# Patient Record
Sex: FEMALE | Race: WHITE | NOT HISPANIC OR LATINO | Employment: FULL TIME | URBAN - NONMETROPOLITAN AREA
[De-identification: names, ages, dates, MRNs, and addresses within clinical notes are randomized per-mention and may not be internally consistent; named-entity substitution may affect disease eponyms.]

---

## 2019-01-30 ENCOUNTER — OFFICE VISIT (OUTPATIENT)
Dept: INTERNAL MEDICINE | Facility: CLINIC | Age: 59
End: 2019-01-30

## 2019-01-30 VITALS
HEIGHT: 62 IN | TEMPERATURE: 97.4 F | OXYGEN SATURATION: 97 % | HEART RATE: 73 BPM | BODY MASS INDEX: 24.48 KG/M2 | SYSTOLIC BLOOD PRESSURE: 124 MMHG | WEIGHT: 133 LBS | DIASTOLIC BLOOD PRESSURE: 80 MMHG

## 2019-01-30 DIAGNOSIS — Z00.00 ANNUAL PHYSICAL EXAM: Primary | ICD-10-CM

## 2019-01-30 DIAGNOSIS — Z12.31 SCREENING MAMMOGRAM, ENCOUNTER FOR: ICD-10-CM

## 2019-01-30 LAB
25(OH)D3+25(OH)D2 SERPL-MCNC: 30.1 NG/ML
ALBUMIN SERPL-MCNC: 4.2 G/DL (ref 3.5–5)
ALBUMIN/GLOB SERPL: 1.7 G/DL (ref 1–2)
ALP SERPL-CCNC: 75 U/L (ref 38–126)
ALT SERPL-CCNC: 22 U/L (ref 13–69)
AST SERPL-CCNC: 17 U/L (ref 15–46)
BASOPHILS # BLD AUTO: 0.04 10*3/MM3 (ref 0–0.2)
BASOPHILS NFR BLD AUTO: 0.7 % (ref 0–2.5)
BILIRUB SERPL-MCNC: 0.8 MG/DL (ref 0.2–1.3)
BUN SERPL-MCNC: 15 MG/DL (ref 7–20)
BUN/CREAT SERPL: 16.7 (ref 7.1–23.5)
CALCIUM SERPL-MCNC: 9.5 MG/DL (ref 8.4–10.2)
CHLORIDE SERPL-SCNC: 109 MMOL/L (ref 98–107)
CHOLEST SERPL-MCNC: 198 MG/DL (ref 0–199)
CHOLEST/HDLC SERPL: 3.36 {RATIO}
CO2 SERPL-SCNC: 25 MMOL/L (ref 26–30)
CREAT SERPL-MCNC: 0.9 MG/DL (ref 0.6–1.3)
EOSINOPHIL # BLD AUTO: 0.06 10*3/MM3 (ref 0–0.7)
EOSINOPHIL NFR BLD AUTO: 1.1 % (ref 0–7)
ERYTHROCYTE [DISTWIDTH] IN BLOOD BY AUTOMATED COUNT: 12.5 % (ref 11.5–14.5)
GLOBULIN SER CALC-MCNC: 2.5 GM/DL
GLUCOSE SERPL-MCNC: 86 MG/DL (ref 74–98)
HBA1C MFR BLD: 5.6 %
HCT VFR BLD AUTO: 40.9 % (ref 37–47)
HDLC SERPL-MCNC: 59 MG/DL (ref 40–60)
HGB BLD-MCNC: 13.5 G/DL (ref 12–16)
IMM GRANULOCYTES # BLD AUTO: 0.01 10*3/MM3 (ref 0–0.06)
IMM GRANULOCYTES NFR BLD AUTO: 0.2 % (ref 0–0.6)
LDLC SERPL CALC-MCNC: 122 MG/DL (ref 0–99)
LYMPHOCYTES # BLD AUTO: 1.75 10*3/MM3 (ref 0.6–3.4)
LYMPHOCYTES NFR BLD AUTO: 31.3 % (ref 10–50)
MCH RBC QN AUTO: 30.8 PG (ref 27–31)
MCHC RBC AUTO-ENTMCNC: 33 G/DL (ref 30–37)
MCV RBC AUTO: 93.2 FL (ref 81–99)
MONOCYTES # BLD AUTO: 0.33 10*3/MM3 (ref 0–0.9)
MONOCYTES NFR BLD AUTO: 5.9 % (ref 0–12)
NEUTROPHILS # BLD AUTO: 3.41 10*3/MM3 (ref 2–6.9)
NEUTROPHILS NFR BLD AUTO: 60.8 % (ref 37–80)
NRBC BLD AUTO-RTO: 0 /100 WBC (ref 0–0)
PLATELET # BLD AUTO: 200 10*3/MM3 (ref 130–400)
POTASSIUM SERPL-SCNC: 4.1 MMOL/L (ref 3.5–5.1)
PROT SERPL-MCNC: 6.7 G/DL (ref 6.3–8.2)
RBC # BLD AUTO: 4.39 10*6/MM3 (ref 4.2–5.4)
SODIUM SERPL-SCNC: 141 MMOL/L (ref 137–145)
TRIGL SERPL-MCNC: 87 MG/DL
TSH SERPL DL<=0.005 MIU/L-ACNC: 5.15 MIU/ML (ref 0.47–4.68)
VLDLC SERPL CALC-MCNC: 17.4 MG/DL
WBC # BLD AUTO: 5.6 10*3/MM3 (ref 4.8–10.8)

## 2019-01-30 PROCEDURE — 99396 PREV VISIT EST AGE 40-64: CPT | Performed by: INTERNAL MEDICINE

## 2019-01-30 NOTE — PROGRESS NOTES
Chief Complaint   Patient presents with   • Annual Exam     Physical only.     Subjective   Sole Cr is a 58 y.o. female.     Patient is here for annual physical exam today.  No significant past medical history except DJD of low back.  Past surgical history of hysterectomy and .  Family history of breast cancer in her mother and colon cancer in paternal grandfather.  Her low back does not bother her at this time.  She does have numbness in her left leg.  She no longer runs, but she does walk for exercise about 3 miles a day.   She did have her colonoscopy done 3 yrs ago.  Next one is due in a few yrs.  She did have a polyp.  She has not had a mammogram in over 7-8 yrs.  Last one was done at [facility].   She does not smoke.  She drinks a glass of wine every few weeks.  No drugs.   She sees eye doctor once a yr.  Goes to dentist twice a yr.  She does wear her seatbelt. She does not text and drive.   She does have some anxiety.  She is using some CBD oil. She is sleeping thru the night and it helps her anxiety a lot.   She has had flu shot and Hep A vaccine.  She will get new shingles vaccine.          The following portions of the patient's history were reviewed and updated as appropriate: allergies, current medications, past family history, past medical history, past social history, past surgical history and problem list.    Review of Systems   Constitutional: Negative for activity change, appetite change and unexpected weight change.   HENT: Negative for hearing loss, trouble swallowing and voice change.    Eyes: Negative for visual disturbance.   Respiratory: Negative for shortness of breath.    Cardiovascular: Negative for chest pain, palpitations and leg swelling.   Gastrointestinal: Negative for abdominal pain and blood in stool.   Endocrine: Negative for cold intolerance and heat intolerance.   Genitourinary: Negative for difficulty urinating.   Musculoskeletal: Negative for back  "pain.   Skin: Negative.    Allergic/Immunologic: Negative for environmental allergies.   Neurological: Positive for numbness. Negative for headaches.   Psychiatric/Behavioral: Negative for dysphoric mood and sleep disturbance. The patient is nervous/anxious.    All other systems reviewed and are negative.      Objective   /80   Pulse 73   Temp 97.4 °F (36.3 °C)   Ht 157.5 cm (62\")   Wt 60.3 kg (133 lb)   SpO2 97%   BMI 24.33 kg/m²   Body mass index is 24.33 kg/m².  Physical Exam   Constitutional: She is oriented to person, place, and time. She appears well-developed and well-nourished. No distress.   Very pleasant female in NAD   HENT:   Head: Normocephalic and atraumatic.   Right Ear: External ear normal.   Left Ear: External ear normal.   Mouth/Throat: Oropharynx is clear and moist.   Eyes: Conjunctivae and EOM are normal. Pupils are equal, round, and reactive to light.   Neck: Normal range of motion. Neck supple. No thyromegaly present.   Cardiovascular: Normal rate, regular rhythm, normal heart sounds and intact distal pulses.   No murmur heard.  No carotid bruits   Pulmonary/Chest: Effort normal and breath sounds normal. No respiratory distress. She has no wheezes. Right breast exhibits no inverted nipple, no mass, no nipple discharge, no skin change and no tenderness. Left breast exhibits no inverted nipple, no mass, no nipple discharge, no skin change and no tenderness.   Abdominal: Soft. Bowel sounds are normal. She exhibits no distension. There is no tenderness.   Musculoskeletal: Normal range of motion. She exhibits no edema.   Lymphadenopathy:     She has no cervical adenopathy.   Neurological: She is alert and oriented to person, place, and time. No cranial nerve deficit. Coordination normal.   Absent left patellar reflex   Psychiatric: She has a normal mood and affect. Her behavior is normal. Judgment and thought content normal.   No significant anxiety on exam today   Nursing note and " vitals reviewed.      Assessment/Plan   Sole Cr is here today and the following problems have been addressed:      Sole was seen today for annual exam.    Diagnoses and all orders for this visit:    Annual physical exam  -     CBC & Differential  -     Comprehensive Metabolic Panel  -     Lipid Panel With / Chol / HDL Ratio  -     TSH  -     Vitamin D 25 Hydroxy  -     Hemoglobin A1c    Screening mammogram, encounter for  -     Mammo Screening Digital Tomosynthesis Bilateral With CAD; Future    Labs as noted  Recommend avoidance of processed foods  Exercise for 30 minutes 5 days a week as tolerated  Recommend annual eye doctor appointment, or as necessary  See your dentist twice a year.  Recommend that you brush teeth twice daily and floss minimum of once daily  Recommend regular seatbelt use  Do not text or use phone while driving  Mammogram ordered, breast exam done today, encourage monthly SBE  Her next colonoscopy is due in 2 yrs  She will get shingles vaccine at her place of employment    RTC one yr for annual exam        Please note that portions of this note were completed with a voice recognition program.  Efforts were made to edit dictation, but occasionally words are mistranscribed.

## 2020-01-31 ENCOUNTER — HOSPITAL ENCOUNTER (OUTPATIENT)
Dept: GENERAL RADIOLOGY | Facility: HOSPITAL | Age: 60
Discharge: HOME | End: 2020-01-31
Admitting: INTERNAL MEDICINE

## 2020-01-31 ENCOUNTER — OFFICE VISIT (OUTPATIENT)
Dept: INTERNAL MEDICINE | Facility: CLINIC | Age: 60
End: 2020-01-31

## 2020-01-31 VITALS
HEART RATE: 74 BPM | WEIGHT: 133 LBS | BODY MASS INDEX: 24.48 KG/M2 | DIASTOLIC BLOOD PRESSURE: 62 MMHG | OXYGEN SATURATION: 98 % | HEIGHT: 62 IN | SYSTOLIC BLOOD PRESSURE: 118 MMHG | TEMPERATURE: 98.5 F

## 2020-01-31 DIAGNOSIS — R79.89 ELEVATED TSH: ICD-10-CM

## 2020-01-31 DIAGNOSIS — R07.89 RIGHT-SIDED CHEST WALL PAIN: ICD-10-CM

## 2020-01-31 DIAGNOSIS — E78.41 ELEVATED LIPOPROTEIN(A): ICD-10-CM

## 2020-01-31 DIAGNOSIS — Z00.00 ANNUAL PHYSICAL EXAM: Primary | ICD-10-CM

## 2020-01-31 LAB
ALBUMIN SERPL-MCNC: 4.5 G/DL (ref 3.5–5.2)
ALBUMIN/GLOB SERPL: 2.5 G/DL
ALP SERPL-CCNC: 79 U/L (ref 39–117)
ALT SERPL-CCNC: 14 U/L (ref 1–33)
AST SERPL-CCNC: 19 U/L (ref 1–32)
BASOPHILS # BLD AUTO: 0.05 10*3/MM3 (ref 0–0.2)
BASOPHILS NFR BLD AUTO: 0.8 % (ref 0–1.5)
BILIRUB SERPL-MCNC: 0.7 MG/DL (ref 0.2–1.2)
BUN SERPL-MCNC: 15 MG/DL (ref 6–20)
BUN/CREAT SERPL: 14.9 (ref 7–25)
CALCIUM SERPL-MCNC: 9.3 MG/DL (ref 8.6–10.5)
CHLORIDE SERPL-SCNC: 108 MMOL/L (ref 98–107)
CHOLEST SERPL-MCNC: 245 MG/DL (ref 0–200)
CHOLEST/HDLC SERPL: 4.08 {RATIO}
CO2 SERPL-SCNC: 25.1 MMOL/L (ref 22–29)
CREAT SERPL-MCNC: 1.01 MG/DL (ref 0.57–1)
EOSINOPHIL # BLD AUTO: 0.1 10*3/MM3 (ref 0–0.4)
EOSINOPHIL NFR BLD AUTO: 1.5 % (ref 0.3–6.2)
ERYTHROCYTE [DISTWIDTH] IN BLOOD BY AUTOMATED COUNT: 12.8 % (ref 12.3–15.4)
GLOBULIN SER CALC-MCNC: 1.8 GM/DL
GLUCOSE SERPL-MCNC: 90 MG/DL (ref 65–99)
HCT VFR BLD AUTO: 41.1 % (ref 34–46.6)
HDLC SERPL-MCNC: 60 MG/DL (ref 40–60)
HGB BLD-MCNC: 14.1 G/DL (ref 12–15.9)
IMM GRANULOCYTES # BLD AUTO: 0.02 10*3/MM3 (ref 0–0.05)
IMM GRANULOCYTES NFR BLD AUTO: 0.3 % (ref 0–0.5)
LDLC SERPL CALC-MCNC: 162 MG/DL (ref 0–100)
LYMPHOCYTES # BLD AUTO: 1.51 10*3/MM3 (ref 0.7–3.1)
LYMPHOCYTES NFR BLD AUTO: 23.2 % (ref 19.6–45.3)
MCH RBC QN AUTO: 30.7 PG (ref 26.6–33)
MCHC RBC AUTO-ENTMCNC: 34.3 G/DL (ref 31.5–35.7)
MCV RBC AUTO: 89.5 FL (ref 79–97)
MONOCYTES # BLD AUTO: 0.4 10*3/MM3 (ref 0.1–0.9)
MONOCYTES NFR BLD AUTO: 6.1 % (ref 5–12)
NEUTROPHILS # BLD AUTO: 4.44 10*3/MM3 (ref 1.7–7)
NEUTROPHILS NFR BLD AUTO: 68.1 % (ref 42.7–76)
NRBC BLD AUTO-RTO: 0 /100 WBC (ref 0–0.2)
PLATELET # BLD AUTO: 176 10*3/MM3 (ref 140–450)
POTASSIUM SERPL-SCNC: 4.2 MMOL/L (ref 3.5–5.2)
PROT SERPL-MCNC: 6.3 G/DL (ref 6–8.5)
RBC # BLD AUTO: 4.59 10*6/MM3 (ref 3.77–5.28)
SODIUM SERPL-SCNC: 146 MMOL/L (ref 136–145)
TRIGL SERPL-MCNC: 114 MG/DL (ref 0–150)
TSH SERPL DL<=0.005 MIU/L-ACNC: 6.44 UIU/ML (ref 0.27–4.2)
VLDLC SERPL CALC-MCNC: 22.8 MG/DL
WBC # BLD AUTO: 6.52 10*3/MM3 (ref 3.4–10.8)

## 2020-01-31 PROCEDURE — 90674 CCIIV4 VAC NO PRSV 0.5 ML IM: CPT | Performed by: INTERNAL MEDICINE

## 2020-01-31 PROCEDURE — 71046 X-RAY EXAM CHEST 2 VIEWS: CPT

## 2020-01-31 PROCEDURE — 99396 PREV VISIT EST AGE 40-64: CPT | Performed by: INTERNAL MEDICINE

## 2020-01-31 PROCEDURE — 90471 IMMUNIZATION ADMIN: CPT | Performed by: INTERNAL MEDICINE

## 2020-01-31 NOTE — PROGRESS NOTES
Chief Complaint   Patient presents with   • Annual Exam     Physical only.      Subjective   Sole Cr is a 59 y.o. female.     Pt is here today for annual PE.   She has no significant PMH.  She has had a hysterectomy.   HCM- mammogram UTD, due in March.  Colonoscopy done 2015 per Dr Patricio and due this yr-she states he will contact her or she will contact him.  She has had Hep A vaccines and zostavax.  No seasonal flu vaccine or shingrix.  She will get her flu shot today and the shingrix at pharmacy.     Labs last year revealed only mildly elevated LDL of 122 and TSH of 5.15.   She denies any hair loss, depression, constipation, voice changes or difficulty swallowing.   Her father has high cholesterol and is on medication.   She tries to eat a healthy diet and does bring her lunch to work.  She does eat out several times a week.    She walks almost daily, has a dog she walks with.    She sees the eye doctor annually and the dentist twice a yr.   Does wear her seatbelt and does not text and drive.  She has some right sided chest pain that radiates to her back, it is worse with chest wall movement.  No pain with breathing.  No pain with movement of shoulder.  It only hurts when she is laying in bed.  She has no neck pain.  Has been bothering her for several months. She only has pain when she lays on her back, not when she lays on her stomach. It does not hurt every night.  Her weight has been stable.   She has never been a smoker.        The following portions of the patient's history were reviewed and updated as appropriate: allergies, current medications, past family history, past medical history, past social history, past surgical history and problem list.    Review of Systems   Constitutional: Negative for activity change, appetite change and unexpected weight change.   HENT: Negative for hearing loss, tinnitus, trouble swallowing and voice change.    Eyes: Negative for visual disturbance.   Respiratory:  "Negative for shortness of breath.    Cardiovascular: Negative for chest pain, palpitations and leg swelling.   Gastrointestinal: Negative for abdominal pain and constipation.   Endocrine: Negative for cold intolerance and heat intolerance.   Genitourinary: Negative for difficulty urinating.   Musculoskeletal: Negative for back pain.        Right sided chest wall pain, see HPI   Neurological: Negative for headaches.   Psychiatric/Behavioral: Negative for dysphoric mood and sleep disturbance.   All other systems reviewed and are negative.      Objective   /62   Pulse 74   Temp 98.5 °F (36.9 °C)   Ht 157.5 cm (62\")   Wt 60.3 kg (133 lb)   SpO2 98%   BMI 24.33 kg/m²   Body mass index is 24.33 kg/m².  Physical Exam   Constitutional: She is oriented to person, place, and time. She appears well-developed and well-nourished.   Very pleasant female who appears her stated age, she is in no distress today   HENT:   Head: Normocephalic and atraumatic.   Right Ear: External ear normal.   Left Ear: External ear normal.   Mouth/Throat: Oropharynx is clear and moist.   TMs normal bilaterally   Eyes: Pupils are equal, round, and reactive to light. EOM are normal.   Neck: Normal range of motion. Neck supple. No thyromegaly present.   Cardiovascular: Normal rate, regular rhythm, normal heart sounds and intact distal pulses.   No murmur heard.  No carotid bruits   Pulmonary/Chest: Effort normal and breath sounds normal. Right breast exhibits no inverted nipple, no mass, no nipple discharge, no skin change and no tenderness. Left breast exhibits no inverted nipple, no mass, no nipple discharge, no skin change and no tenderness.   Abdominal: Soft. Bowel sounds are normal. She exhibits no distension and no mass. There is no tenderness.   Musculoskeletal: Normal range of motion. She exhibits no edema or tenderness.   No pain over costochondral joints, no pain to palpation over right scapula or upper shoulder muscles or back " muscles   Lymphadenopathy:     She has no cervical adenopathy.   Neurological: She is alert and oriented to person, place, and time. She displays normal reflexes. No cranial nerve deficit. Coordination normal.   Skin: No rash noted.   Psychiatric: She has a normal mood and affect. Her behavior is normal. Judgment and thought content normal.   Nursing note and vitals reviewed.      Assessment/Plan   Sole Cr is here today and the following problems have been addressed:      Sole was seen today for annual exam.    Diagnoses and all orders for this visit:    Annual physical exam  -     CBC & Differential  -     Comprehensive Metabolic Panel  -     Lipid Panel With / Chol / HDL Ratio  -     TSH    Elevated TSH  -     TSH    Elevated lipoprotein(a)  -     Comprehensive Metabolic Panel  -     Lipid Panel With / Chol / HDL Ratio    Right-sided chest wall pain  -     XR Chest 2 View; Future  -     CBC & Differential    Labs as noted  Recommend avoidance of processed foods  Exercise for 30 minutes 5 days a week as tolerated  Recommend annual eye doctor appointment, or as necessary  See your dentist twice a year.  Recommend that you brush teeth twice daily and floss minimum of once daily  Recommend regular seatbelt use  Do not text or use phone while driving  Flu shot given today  X-ray of chest to evaluate ribs as well as underlying lung due to atypical right-sided chest pain-we will contact patient with results and further plan of care  Encourage patient to get shingles vaccine at local pharmacy  Her colonoscopy is due this year, if she does not hear from Dr. Patricio she will contact his office for scheduling  Patient will schedule her mammogram in March when due    Return to clinic in 1 year or as needed        Please note that portions of this note were completed with a voice recognition program.  Efforts were made to edit dictation, but occasionally words are mistranscribed.

## 2020-02-03 NOTE — PROGRESS NOTES
Please tell patient her chest x-ray is normal with no evidence of mass and no abnormalities of ribs noted.

## 2021-02-01 ENCOUNTER — OFFICE VISIT (OUTPATIENT)
Dept: INTERNAL MEDICINE | Facility: CLINIC | Age: 61
End: 2021-02-01

## 2021-02-01 ENCOUNTER — TELEPHONE (OUTPATIENT)
Dept: INTERNAL MEDICINE | Facility: CLINIC | Age: 61
End: 2021-02-01

## 2021-02-01 VITALS
BODY MASS INDEX: 25.95 KG/M2 | DIASTOLIC BLOOD PRESSURE: 60 MMHG | HEIGHT: 62 IN | WEIGHT: 141 LBS | HEART RATE: 81 BPM | SYSTOLIC BLOOD PRESSURE: 122 MMHG | TEMPERATURE: 97.1 F | OXYGEN SATURATION: 98 %

## 2021-02-01 DIAGNOSIS — Z13.820 SCREENING FOR OSTEOPOROSIS: ICD-10-CM

## 2021-02-01 DIAGNOSIS — E78.2 MIXED HYPERLIPIDEMIA: ICD-10-CM

## 2021-02-01 DIAGNOSIS — Z11.59 ENCOUNTER FOR HEPATITIS C SCREENING TEST FOR LOW RISK PATIENT: ICD-10-CM

## 2021-02-01 DIAGNOSIS — Z00.00 ANNUAL PHYSICAL EXAM: Primary | ICD-10-CM

## 2021-02-01 DIAGNOSIS — R79.89 ELEVATED TSH: ICD-10-CM

## 2021-02-01 PROBLEM — R07.89 RIGHT-SIDED CHEST WALL PAIN: Status: RESOLVED | Noted: 2020-01-31 | Resolved: 2021-02-01

## 2021-02-01 PROBLEM — E78.41 ELEVATED LIPOPROTEIN(A): Status: RESOLVED | Noted: 2020-01-31 | Resolved: 2021-02-01

## 2021-02-01 LAB
ALBUMIN SERPL-MCNC: 4.1 G/DL (ref 3.5–5.2)
ALBUMIN/GLOB SERPL: 2.1 G/DL
ALP SERPL-CCNC: 87 U/L (ref 39–117)
ALT SERPL-CCNC: 16 U/L (ref 1–33)
AST SERPL-CCNC: 16 U/L (ref 1–32)
BASOPHILS # BLD AUTO: 0.03 10*3/MM3 (ref 0–0.2)
BASOPHILS NFR BLD AUTO: 0.5 % (ref 0–1.5)
BILIRUB SERPL-MCNC: 0.3 MG/DL (ref 0–1.2)
BUN SERPL-MCNC: 23 MG/DL (ref 8–23)
BUN/CREAT SERPL: 24.5 (ref 7–25)
CALCIUM SERPL-MCNC: 8.9 MG/DL (ref 8.6–10.5)
CHLORIDE SERPL-SCNC: 110 MMOL/L (ref 98–107)
CHOLEST SERPL-MCNC: 218 MG/DL (ref 0–200)
CHOLEST/HDLC SERPL: 3.46 {RATIO}
CO2 SERPL-SCNC: 23.2 MMOL/L (ref 22–29)
CREAT SERPL-MCNC: 0.94 MG/DL (ref 0.57–1)
EOSINOPHIL # BLD AUTO: 0.08 10*3/MM3 (ref 0–0.4)
EOSINOPHIL NFR BLD AUTO: 1.4 % (ref 0.3–6.2)
ERYTHROCYTE [DISTWIDTH] IN BLOOD BY AUTOMATED COUNT: 13.1 % (ref 12.3–15.4)
GLOBULIN SER CALC-MCNC: 2 GM/DL
GLUCOSE SERPL-MCNC: 99 MG/DL (ref 65–99)
HCT VFR BLD AUTO: 40.5 % (ref 34–46.6)
HDLC SERPL-MCNC: 63 MG/DL (ref 40–60)
HGB BLD-MCNC: 13.8 G/DL (ref 12–15.9)
IMM GRANULOCYTES # BLD AUTO: 0 10*3/MM3 (ref 0–0.05)
IMM GRANULOCYTES NFR BLD AUTO: 0 % (ref 0–0.5)
LDLC SERPL CALC-MCNC: 137 MG/DL (ref 0–100)
LYMPHOCYTES # BLD AUTO: 1.66 10*3/MM3 (ref 0.7–3.1)
LYMPHOCYTES NFR BLD AUTO: 29.1 % (ref 19.6–45.3)
MCH RBC QN AUTO: 31.7 PG (ref 26.6–33)
MCHC RBC AUTO-ENTMCNC: 34.1 G/DL (ref 31.5–35.7)
MCV RBC AUTO: 93.1 FL (ref 79–97)
MONOCYTES # BLD AUTO: 0.41 10*3/MM3 (ref 0.1–0.9)
MONOCYTES NFR BLD AUTO: 7.2 % (ref 5–12)
NEUTROPHILS # BLD AUTO: 3.53 10*3/MM3 (ref 1.7–7)
NEUTROPHILS NFR BLD AUTO: 61.8 % (ref 42.7–76)
NRBC BLD AUTO-RTO: 0 /100 WBC (ref 0–0.2)
PLATELET # BLD AUTO: 188 10*3/MM3 (ref 140–450)
POTASSIUM SERPL-SCNC: 4.3 MMOL/L (ref 3.5–5.2)
PROT SERPL-MCNC: 6.1 G/DL (ref 6–8.5)
RBC # BLD AUTO: 4.35 10*6/MM3 (ref 3.77–5.28)
SODIUM SERPL-SCNC: 143 MMOL/L (ref 136–145)
T4 FREE SERPL-MCNC: 0.98 NG/DL (ref 0.93–1.7)
TRIGL SERPL-MCNC: 104 MG/DL (ref 0–150)
TSH SERPL DL<=0.005 MIU/L-ACNC: 5.98 UIU/ML (ref 0.27–4.2)
VLDLC SERPL CALC-MCNC: 18 MG/DL (ref 5–40)
WBC # BLD AUTO: 5.71 10*3/MM3 (ref 3.4–10.8)

## 2021-02-01 PROCEDURE — 99396 PREV VISIT EST AGE 40-64: CPT | Performed by: INTERNAL MEDICINE

## 2021-02-01 NOTE — TELEPHONE ENCOUNTER
Attempted contacting Pt to let her know that she left her gloves.     HUB may deliver message: Patient left gloves at Dr. Vermeesch's office.

## 2021-02-01 NOTE — PROGRESS NOTES
Chief Complaint   Patient presents with   • Annual Exam     Subjective   Sole Cr is a 60 y.o. female.     Pt is here today for annual PE.   She has no significant PMH.  She has had a hysterectomy.   HCM- mammogram UTD, due in March.  Colonoscopy done 2020 per Dr Patricio-next due in 10 yrs again.  She has had Hep A vaccines and zostavax.  Seasonal flu vaccine is not up-to-date.  She still has not had a Shingrix vaccine.   She had her first COVID vaccine done thru work.  She has never had a DEXA scan.  Her mother had OP.  She does not take any vit D   Labs last year revealed elevated TSH of 6.6 and elevated total and LDL cholesterol however lipid ratio was just above 4.  She denies any hair loss, depression, constipation, voice changes or difficulty swallowing.   Her father has high cholesterol and is on medication.   She has been working from home and is not eating a HH diet.  She walks almost daily, has a dog she walks with.  She does not exercise though.   She sees the eye doctor annually and the dentist twice a yr.  No recent issues with vision.   Does wear her seatbelt and does not text and drive.  Does not drink or smoke.   Saw derm and is using a cream over nose, very red and tender.         The following portions of the patient's history were reviewed and updated as appropriate: allergies, current medications, past family history, past medical history, past social history, past surgical history and problem list.    Review of Systems   Constitutional: Positive for activity change. Negative for appetite change.   HENT: Negative.    Eyes: Negative.    Respiratory: Negative.    Cardiovascular: Negative.    Gastrointestinal:        Increased GERD sxs   Endocrine: Negative.    Genitourinary: Negative.    Musculoskeletal: Negative.    Skin:        Skin changes over nose   Allergic/Immunologic: Negative.    Neurological: Negative.    Hematological: Negative.    Psychiatric/Behavioral: Negative.        Objective  "  /60   Pulse 81   Temp 97.1 °F (36.2 °C)   Ht 157.5 cm (62\")   Wt 64 kg (141 lb)   SpO2 98%   BMI 25.79 kg/m²   Body mass index is 25.79 kg/m².  Physical Exam  Vitals signs and nursing note reviewed.   Constitutional:       General: She is not in acute distress.     Appearance: Normal appearance. She is well-developed. She is not ill-appearing.      Comments: Kind and pleasant female, appears her age and in no distress today   HENT:      Head: Normocephalic and atraumatic.      Right Ear: Tympanic membrane, ear canal and external ear normal.      Left Ear: Tympanic membrane, ear canal and external ear normal.   Eyes:      General:         Right eye: No discharge.         Left eye: No discharge.      Extraocular Movements: Extraocular movements intact.      Conjunctiva/sclera: Conjunctivae normal.      Pupils: Pupils are equal, round, and reactive to light.   Neck:      Musculoskeletal: Normal range of motion and neck supple.      Thyroid: No thyromegaly.      Comments: No thyromegaly or masses  Cardiovascular:      Rate and Rhythm: Normal rate and regular rhythm.      Pulses: Normal pulses.      Heart sounds: Normal heart sounds. No murmur.      Comments: No carotid bruits  Pulmonary:      Effort: Pulmonary effort is normal. No respiratory distress.      Breath sounds: Normal breath sounds. No wheezing.   Abdominal:      General: Bowel sounds are normal. There is no distension.      Palpations: Abdomen is soft.      Tenderness: There is no abdominal tenderness.   Musculoskeletal: Normal range of motion.      Right lower leg: No edema.      Left lower leg: No edema.   Lymphadenopathy:      Cervical: No cervical adenopathy.   Skin:     General: Skin is warm.      Findings: Erythema present. No rash.      Comments: Nose is erythematous and dry with mild peeling due to chemical she is using from dermatologist   Neurological:      General: No focal deficit present.      Mental Status: She is alert and " oriented to person, place, and time. Mental status is at baseline.      Cranial Nerves: No cranial nerve deficit.      Motor: No weakness.      Coordination: Coordination normal.   Psychiatric:         Mood and Affect: Mood normal.         Behavior: Behavior normal.         Thought Content: Thought content normal.         Judgment: Judgment normal.         Assessment/Plan   Sole Cr is here today and the following problems have been addressed:      Diagnoses and all orders for this visit:    1. Annual physical exam (Primary)  -     CBC & Differential  -     Comprehensive Metabolic Panel  -     Lipid Panel With / Chol / HDL Ratio  -     TSH  -     T4, Free  -     Hepatitis C Antibody    2. Elevated TSH  -     TSH  -     T4, Free    3. Mixed hyperlipidemia  -     Comprehensive Metabolic Panel    4. Encounter for hepatitis C screening test for low risk patient  -     Hepatitis C Antibody    5. Screening for osteoporosis  -     DEXA Bone Density Axial; Future    Labs as noted  Recommend annual eye doctor appointment, or as necessary  See your dentist twice a year.  Recommend that you brush teeth twice daily and floss minimum of once daily  Recommend regular seatbelt use  Do not text or use phone while driving  Patient will schedule her mammogram, she declined breast exam today  DEXA scan ordered  Recommend vitamin D 1000 units daily  Follow heart healthy/low cholesterol diet  Avoid processed/fried foods/fast food  Exercise as tolerated up to 30 minutes 5 days a week  Take all medications as prescribed  She has had part 1 of Covid vaccine and will receive part 2 with her employer    Return to clinic in 1 year for annual exam      Please note that portions of this note were completed with a voice recognition program.  Efforts were made to edit dictation, but occasionally words are mistranscribed.

## 2021-02-02 LAB — HCV AB S/CO SERPL IA: <0.1 S/CO RATIO (ref 0–0.9)

## 2021-02-11 ENCOUNTER — APPOINTMENT (OUTPATIENT)
Dept: BONE DENSITY | Facility: HOSPITAL | Age: 61
End: 2021-02-11

## 2021-02-23 ENCOUNTER — APPOINTMENT (OUTPATIENT)
Dept: BONE DENSITY | Facility: HOSPITAL | Age: 61
End: 2021-02-23

## 2021-02-23 DIAGNOSIS — Z13.820 SCREENING FOR OSTEOPOROSIS: ICD-10-CM

## 2021-02-23 PROCEDURE — 77080 DXA BONE DENSITY AXIAL: CPT

## 2021-02-23 NOTE — PROGRESS NOTES
Please tell patient bone density scan reveals normal bone density in lumbar spine, however osteopenia in left femoral neck.  I recommend she start vitamin D 2000 units daily and eat plenty of foods high in calcium such as dark leafy greens, yogurt and organic milk.

## 2021-10-02 PROCEDURE — U0004 COV-19 TEST NON-CDC HGH THRU: HCPCS | Performed by: NURSE PRACTITIONER

## 2022-02-02 ENCOUNTER — OFFICE VISIT (OUTPATIENT)
Dept: INTERNAL MEDICINE | Facility: CLINIC | Age: 62
End: 2022-02-02

## 2022-02-02 VITALS
TEMPERATURE: 97.3 F | SYSTOLIC BLOOD PRESSURE: 120 MMHG | HEIGHT: 62 IN | DIASTOLIC BLOOD PRESSURE: 68 MMHG | OXYGEN SATURATION: 98 % | BODY MASS INDEX: 24.11 KG/M2 | HEART RATE: 78 BPM | WEIGHT: 131 LBS

## 2022-02-02 DIAGNOSIS — Z12.31 SCREENING MAMMOGRAM, ENCOUNTER FOR: ICD-10-CM

## 2022-02-02 DIAGNOSIS — M85.852 OSTEOPENIA OF NECK OF LEFT FEMUR: ICD-10-CM

## 2022-02-02 DIAGNOSIS — R79.89 ELEVATED TSH: ICD-10-CM

## 2022-02-02 DIAGNOSIS — Z00.00 ANNUAL PHYSICAL EXAM: Primary | ICD-10-CM

## 2022-02-02 DIAGNOSIS — F41.9 ANXIETY: ICD-10-CM

## 2022-02-02 DIAGNOSIS — E78.2 MIXED HYPERLIPIDEMIA: ICD-10-CM

## 2022-02-02 PROBLEM — Z13.820 SCREENING FOR OSTEOPOROSIS: Status: RESOLVED | Noted: 2021-02-01 | Resolved: 2022-02-02

## 2022-02-02 PROBLEM — Z11.59 ENCOUNTER FOR HEPATITIS C SCREENING TEST FOR LOW RISK PATIENT: Status: RESOLVED | Noted: 2021-02-01 | Resolved: 2022-02-02

## 2022-02-02 LAB
ALBUMIN SERPL-MCNC: 4.5 G/DL (ref 3.5–5.2)
ALBUMIN/GLOB SERPL: 2.4 G/DL
ALP SERPL-CCNC: 82 U/L (ref 39–117)
ALT SERPL-CCNC: 17 U/L (ref 1–33)
AST SERPL-CCNC: 16 U/L (ref 1–32)
BASOPHILS # BLD AUTO: 0.05 10*3/MM3 (ref 0–0.2)
BASOPHILS NFR BLD AUTO: 1 % (ref 0–1.5)
BILIRUB SERPL-MCNC: 0.4 MG/DL (ref 0–1.2)
BUN SERPL-MCNC: 18 MG/DL (ref 8–23)
BUN/CREAT SERPL: 17.8 (ref 7–25)
CALCIUM SERPL-MCNC: 9.6 MG/DL (ref 8.6–10.5)
CHLORIDE SERPL-SCNC: 106 MMOL/L (ref 98–107)
CHOLEST SERPL-MCNC: 227 MG/DL (ref 0–200)
CHOLEST/HDLC SERPL: 3.29 {RATIO}
CO2 SERPL-SCNC: 27.6 MMOL/L (ref 22–29)
CREAT SERPL-MCNC: 1.01 MG/DL (ref 0.57–1)
EOSINOPHIL # BLD AUTO: 0.15 10*3/MM3 (ref 0–0.4)
EOSINOPHIL NFR BLD AUTO: 3.1 % (ref 0.3–6.2)
ERYTHROCYTE [DISTWIDTH] IN BLOOD BY AUTOMATED COUNT: 13.2 % (ref 12.3–15.4)
GLOBULIN SER CALC-MCNC: 1.9 GM/DL
GLUCOSE SERPL-MCNC: 93 MG/DL (ref 65–99)
HCT VFR BLD AUTO: 44.8 % (ref 34–46.6)
HDLC SERPL-MCNC: 69 MG/DL (ref 40–60)
HGB BLD-MCNC: 14.3 G/DL (ref 12–15.9)
IMM GRANULOCYTES # BLD AUTO: 0.01 10*3/MM3 (ref 0–0.05)
IMM GRANULOCYTES NFR BLD AUTO: 0.2 % (ref 0–0.5)
LDLC SERPL CALC-MCNC: 144 MG/DL (ref 0–100)
LYMPHOCYTES # BLD AUTO: 1.78 10*3/MM3 (ref 0.7–3.1)
LYMPHOCYTES NFR BLD AUTO: 37.1 % (ref 19.6–45.3)
MCH RBC QN AUTO: 30.3 PG (ref 26.6–33)
MCHC RBC AUTO-ENTMCNC: 31.9 G/DL (ref 31.5–35.7)
MCV RBC AUTO: 94.9 FL (ref 79–97)
MONOCYTES # BLD AUTO: 0.39 10*3/MM3 (ref 0.1–0.9)
MONOCYTES NFR BLD AUTO: 8.1 % (ref 5–12)
NEUTROPHILS # BLD AUTO: 2.42 10*3/MM3 (ref 1.7–7)
NEUTROPHILS NFR BLD AUTO: 50.5 % (ref 42.7–76)
NRBC BLD AUTO-RTO: 0 /100 WBC (ref 0–0.2)
PLATELET # BLD AUTO: 200 10*3/MM3 (ref 140–450)
POTASSIUM SERPL-SCNC: 4.6 MMOL/L (ref 3.5–5.2)
PROT SERPL-MCNC: 6.4 G/DL (ref 6–8.5)
RBC # BLD AUTO: 4.72 10*6/MM3 (ref 3.77–5.28)
SODIUM SERPL-SCNC: 143 MMOL/L (ref 136–145)
T4 FREE SERPL-MCNC: 1.2 NG/DL (ref 0.93–1.7)
TRIGL SERPL-MCNC: 78 MG/DL (ref 0–150)
TSH SERPL DL<=0.005 MIU/L-ACNC: 5.53 UIU/ML (ref 0.27–4.2)
VLDLC SERPL CALC-MCNC: 14 MG/DL (ref 5–40)
WBC # BLD AUTO: 4.8 10*3/MM3 (ref 3.4–10.8)

## 2022-02-02 PROCEDURE — 99396 PREV VISIT EST AGE 40-64: CPT | Performed by: INTERNAL MEDICINE

## 2022-02-02 RX ORDER — BUSPIRONE HYDROCHLORIDE 10 MG/1
10 TABLET ORAL 3 TIMES DAILY PRN
Qty: 60 TABLET | Refills: 3 | Status: SHIPPED | OUTPATIENT
Start: 2022-02-02

## 2022-02-02 NOTE — PROGRESS NOTES
Chief Complaint   Patient presents with   • Annual Exam     Physical only.      Subjective   Sole Cr is a 61 y.o. female.     Pt is here today for annual PE.   She has no significant PMH.  She has had a hysterectomy.   HCM- mammogram March 2020, past due.  Colonoscopy done 2020 per Dr Patricio-next due in 10 yrs again.  She has had Hep A vaccines and zostavax.  Seasonal flu vaccine is not up-to-date.  She still has not had a Shingrix vaccine.   She had 2 COVID vaccines but no booster.  DEXA 2021 revealed osteopenia-she is taking vit D 2000 u a day.   Labs last year revealed elevated TSH of 5.9 and elevated total and LDL cholesterol.  She denies any hair loss, depression, constipation, voice changes or difficulty swallowing.    She has been having some back pain on and off.   She walks almost daily with her dog.  She does not exercise though.   She tries to eat a HH diet, avoids fast and fatty foods.   She sees the eye doctor annually and the dentist twice a yr.  No recent issues with vision.   Does wear her seatbelt and does not text and drive.  Does not drink or smoke.   She has been having some anxiety over the past yr.  Her son is currently in outpatient drug rehab program.  She is always anxious about him.  She is sleeping OK.        The following portions of the patient's history were reviewed and updated as appropriate: allergies, current medications, past family history, past medical history, past social history, past surgical history and problem list.    Review of Systems   Constitutional: Negative.    HENT: Negative.  Negative for trouble swallowing and voice change.    Eyes: Negative.    Respiratory: Negative.    Cardiovascular: Negative.    Gastrointestinal: Negative.  Negative for constipation.   Endocrine: Negative.  Negative for cold intolerance and heat intolerance.   Genitourinary: Negative.    Musculoskeletal: Positive for back pain.   Skin: Negative.    Allergic/Immunologic: Negative.   "  Neurological: Negative.    Hematological: Negative.    Psychiatric/Behavioral: Negative for dysphoric mood and sleep disturbance. The patient is nervous/anxious.        Objective   /68   Pulse 78   Temp 97.3 °F (36.3 °C)   Ht 157.5 cm (62\")   Wt 59.4 kg (131 lb)   SpO2 98%   BMI 23.96 kg/m²   Body mass index is 23.96 kg/m².  Physical Exam  Vitals and nursing note reviewed.   Constitutional:       General: She is not in acute distress.     Appearance: Normal appearance. She is well-developed. She is not ill-appearing.      Comments: Kind and pleasant female, appears stated age and in NAD today   HENT:      Head: Normocephalic and atraumatic.      Right Ear: Tympanic membrane, ear canal and external ear normal.      Left Ear: Tympanic membrane, ear canal and external ear normal.   Eyes:      General:         Right eye: No discharge.         Left eye: No discharge.      Extraocular Movements: Extraocular movements intact.      Conjunctiva/sclera: Conjunctivae normal.      Pupils: Pupils are equal, round, and reactive to light.   Neck:      Thyroid: No thyromegaly.      Vascular: No carotid bruit.      Comments: No thyromegaly or mass  Cardiovascular:      Rate and Rhythm: Normal rate and regular rhythm.      Pulses: Normal pulses.      Heart sounds: Normal heart sounds. No murmur heard.      Pulmonary:      Effort: Pulmonary effort is normal. No respiratory distress.      Breath sounds: Normal breath sounds. No wheezing.   Chest:   Breasts:      Right: Normal. No inverted nipple, mass, nipple discharge, skin change, tenderness, axillary adenopathy or supraclavicular adenopathy.      Left: Normal. No inverted nipple, mass, nipple discharge, skin change, tenderness, axillary adenopathy or supraclavicular adenopathy.       Abdominal:      General: Bowel sounds are normal. There is no distension.      Palpations: Abdomen is soft.      Tenderness: There is no abdominal tenderness.   Musculoskeletal:         " General: Normal range of motion.      Cervical back: Normal range of motion and neck supple.      Right lower leg: No edema.      Left lower leg: No edema.   Lymphadenopathy:      Cervical: No cervical adenopathy.      Upper Body:      Right upper body: No supraclavicular, axillary or pectoral adenopathy.      Left upper body: No supraclavicular, axillary or pectoral adenopathy.   Skin:     General: Skin is warm.      Findings: No rash.   Neurological:      General: No focal deficit present.      Mental Status: She is alert and oriented to person, place, and time. Mental status is at baseline.      Cranial Nerves: No cranial nerve deficit.      Motor: No weakness.      Coordination: Coordination normal.      Gait: Gait normal.   Psychiatric:         Mood and Affect: Mood normal.         Behavior: Behavior normal.         Thought Content: Thought content normal.         Judgment: Judgment normal.         Assessment/Plan   Soleregina Cr is here today and the following problems have been addressed:      Diagnoses and all orders for this visit:    1. Annual physical exam (Primary)  -     CBC & Differential  -     Comprehensive Metabolic Panel  -     Lipid Panel With / Chol / HDL Ratio  -     T4, Free  -     TSH    2. Mixed hyperlipidemia  -     Comprehensive Metabolic Panel  -     Lipid Panel With / Chol / HDL Ratio    3. Elevated TSH  -     T4, Free  -     TSH    4. Screening mammogram, encounter for  -     Mammo Screening Digital Tomosynthesis Bilateral With CAD; Future    5. Anxiety  -     Ambulatory Referral to Psychology    6. Osteopenia of neck of left femur    Other orders  -     busPIRone (BUSPAR) 10 MG tablet; Take 1 tablet by mouth 3 (Three) Times a Day As Needed (anxiety).  Dispense: 60 tablet; Refill: 3    Labs as noted  Recommend avoidance of processed foods, follow low-fat/low-cholesterol diet  Exercise for 30 minutes 5 days a week as tolerated  Recommend annual eye doctor appointment, or as  necessary  See your dentist twice a year.  Recommend that you brush teeth twice daily and floss minimum of once daily  Recommend regular seatbelt use  Do not text or use phone while driving  Mammogram ordered, will schedule for patient  Patient is having significant anxiety and is agreeable to see counselor, referral placed  Provided with BuSpar 10 mg to take 3 times daily as needed, discussed possible side effects and proper use of medication with patient  Encouraged patient to get shingles vaccine at local pharmacy  Continue daily vitamin D due to underlying history of osteopenia    Return to clinic in 1 year or as needed      Please note that portions of this note were completed with a voice recognition program.  Efforts were made to edit dictation, but occasionally words are mistranscribed.

## 2023-02-03 ENCOUNTER — OFFICE VISIT (OUTPATIENT)
Dept: INTERNAL MEDICINE | Facility: CLINIC | Age: 63
End: 2023-02-03
Payer: COMMERCIAL

## 2023-02-03 VITALS
SYSTOLIC BLOOD PRESSURE: 118 MMHG | HEIGHT: 62 IN | BODY MASS INDEX: 24.29 KG/M2 | OXYGEN SATURATION: 97 % | DIASTOLIC BLOOD PRESSURE: 62 MMHG | WEIGHT: 132 LBS | HEART RATE: 67 BPM | TEMPERATURE: 97.4 F

## 2023-02-03 DIAGNOSIS — E78.2 MIXED HYPERLIPIDEMIA: ICD-10-CM

## 2023-02-03 DIAGNOSIS — Z00.00 ANNUAL PHYSICAL EXAM: Primary | ICD-10-CM

## 2023-02-03 DIAGNOSIS — R79.89 ELEVATED TSH: ICD-10-CM

## 2023-02-03 DIAGNOSIS — F41.9 ANXIETY: ICD-10-CM

## 2023-02-03 DIAGNOSIS — M85.852 OSTEOPENIA OF NECK OF LEFT FEMUR: ICD-10-CM

## 2023-02-03 PROCEDURE — 99396 PREV VISIT EST AGE 40-64: CPT | Performed by: INTERNAL MEDICINE

## 2023-02-03 NOTE — PROGRESS NOTES
Chief Complaint   Patient presents with   • Annual Exam     Subjective   Sole Cr is a 62 y.o. female.     History of Present Illness  Here today for annual physical exam.  PMH of HLD, anxiety, osteopenia and elevated TSH.  HLD-total cholesterol was 227 with an LDL of 144 last year.  Her HDL is high though at 69.  She goes out to eat 4 times a week.  She does not exercise formally but does walk with her dog on a regular basis, says she walks fast.  Anxiety-she was provided with BuSpar to take on an as-needed basis. Does not use it much, makes her nauseous.  She feels better, her son is in recovery.  Osteopenia-DEXA scan 2021 reveals normal bone density of spine but osteopenia of left femur.  She is currently on vitamin D 2000 u daily  Elevated TSH-her free T4 is in normal range but TSH is slightly elevated.  She denies any difficulty swallowing, change in voice, constipation, hair loss.  HCM-colonoscopy 2020.  Mammogram is due in April.  No need for Pap due to hysterectomy and not currently on hormones.  She has had 2 COVID vaccines but no boosters.  No seasonal flu vaccine.  No shingles vaccine.  No documented pneumococcal vaccine.  She does not smoke, may have one drink a week.  Sees eye doctor annually and dentist twice a year.  Compliant with seatbelt use.  Does not text and drive.  She has a cyst on right wrist.  Also has some numbness at wrist sometimes with with certain movements.  She is going to contact Ortho for evaluation of her wrist.       The following portions of the patient's history were reviewed and updated as appropriate: allergies, current medications, past family history, past medical history, past social history, past surgical history and problem list.    Review of Systems   Constitutional: Negative for activity change, appetite change and unexpected weight change.   HENT: Positive for tinnitus. Negative for hearing loss, trouble swallowing and voice change.    Eyes: Negative for visual  "disturbance.   Respiratory: Negative for shortness of breath.    Cardiovascular: Negative for chest pain, palpitations and leg swelling.   Gastrointestinal: Negative for abdominal pain and constipation.   Musculoskeletal: Negative for back pain.        Right wrist pain with certain movement   Neurological: Negative for headaches.   Psychiatric/Behavioral: Negative for dysphoric mood and sleep disturbance. The patient is nervous/anxious.        Objective   /62   Pulse 67   Temp 97.4 °F (36.3 °C)   Ht 157.5 cm (62\")   Wt 59.9 kg (132 lb)   SpO2 97%   BMI 24.14 kg/m²   Body mass index is 24.14 kg/m².  Physical Exam  Vitals and nursing note reviewed.   Constitutional:       General: She is not in acute distress.     Appearance: Normal appearance. She is well-developed. She is not ill-appearing.      Comments: Kind and pleasant female, appears stated age and in NAD today   HENT:      Head: Normocephalic and atraumatic.      Right Ear: Tympanic membrane, ear canal and external ear normal.      Left Ear: Tympanic membrane, ear canal and external ear normal.      Nose: No congestion.      Mouth/Throat:      Pharynx: No posterior oropharyngeal erythema.   Eyes:      General:         Right eye: No discharge.         Left eye: No discharge.      Extraocular Movements: Extraocular movements intact.      Conjunctiva/sclera: Conjunctivae normal.      Pupils: Pupils are equal, round, and reactive to light.   Neck:      Thyroid: No thyromegaly.      Vascular: No carotid bruit.      Comments: No thyromegaly or mass  Cardiovascular:      Rate and Rhythm: Normal rate and regular rhythm.      Pulses: Normal pulses.      Heart sounds: Normal heart sounds. No murmur heard.  Pulmonary:      Effort: Pulmonary effort is normal. No respiratory distress.      Breath sounds: Normal breath sounds. No wheezing.   Chest:   Breasts:     Right: Normal.      Left: Normal.   Abdominal:      General: Bowel sounds are normal. There is no " distension.      Palpations: Abdomen is soft.      Tenderness: There is no abdominal tenderness.   Musculoskeletal:         General: Normal range of motion.      Cervical back: Normal range of motion and neck supple.      Right lower leg: No edema.      Left lower leg: No edema.      Comments: Right wrist medial aspect over ulnar area with approximate 1 cm cystic lesion that is mobile   Lymphadenopathy:      Cervical: No cervical adenopathy.      Upper Body:      Right upper body: No supraclavicular, axillary or pectoral adenopathy.      Left upper body: No supraclavicular, axillary or pectoral adenopathy.   Skin:     General: Skin is warm.      Findings: No rash.   Neurological:      General: No focal deficit present.      Mental Status: She is alert and oriented to person, place, and time. Mental status is at baseline.      Cranial Nerves: No cranial nerve deficit.      Motor: No weakness.      Coordination: Coordination normal.      Gait: Gait normal.   Psychiatric:         Mood and Affect: Mood normal.         Behavior: Behavior normal.         Thought Content: Thought content normal.         Judgment: Judgment normal.         Assessment & Plan   Sole Cr is here today and the following problems have been addressed:      Diagnoses and all orders for this visit:    1. Annual physical exam (Primary)  -     CBC & Differential  -     Comprehensive Metabolic Panel  -     Lipid Panel With / Chol / HDL Ratio  -     T4, Free  -     TSH    2. Mixed hyperlipidemia  -     Comprehensive Metabolic Panel  -     Lipid Panel With / Chol / HDL Ratio    3. Anxiety  -     T4, Free  -     TSH    4. Osteopenia of neck of left femur    5. Elevated TSH  -     T4, Free  -     TSH    Labs as noted  Recommend avoidance of processed foods  Exercise for 30 minutes 5 days a week as tolerated  Recommend annual eye doctor appointment, or as necessary  See your dentist twice a year.  Recommend that you brush teeth twice daily and floss  minimum of once daily  Recommend regular seatbelt use  Do not text or use phone while driving  Recommend low-fat/low-cholesterol diet  Continue BuSpar as needed for anxiety  Continue daily vitamin D  Patient declines any further vaccines at this time  All other healthcare maintenance is up-to-date including DEXA, mammogram, and colonoscopy.  Her mammogram is due in April and she will schedule this    Return to clinic in 1 year for annual exam or as needed    Part of this note may be an electronic transcription/translation of spoken language to printed text using the Dragon Dictation System.

## 2023-02-04 LAB
ALBUMIN SERPL-MCNC: 4.1 G/DL (ref 3.5–5.2)
ALBUMIN/GLOB SERPL: 2.1 G/DL
ALP SERPL-CCNC: 73 U/L (ref 39–117)
ALT SERPL-CCNC: 15 U/L (ref 1–33)
AST SERPL-CCNC: 19 U/L (ref 1–32)
BASOPHILS # BLD AUTO: 0.03 10*3/MM3 (ref 0–0.2)
BASOPHILS NFR BLD AUTO: 0.7 % (ref 0–1.5)
BILIRUB SERPL-MCNC: 0.4 MG/DL (ref 0–1.2)
BUN SERPL-MCNC: 15 MG/DL (ref 8–23)
BUN/CREAT SERPL: 15.3 (ref 7–25)
CALCIUM SERPL-MCNC: 8.9 MG/DL (ref 8.6–10.5)
CHLORIDE SERPL-SCNC: 109 MMOL/L (ref 98–107)
CHOLEST SERPL-MCNC: 240 MG/DL (ref 0–200)
CHOLEST/HDLC SERPL: 3.58 {RATIO}
CO2 SERPL-SCNC: 25.4 MMOL/L (ref 22–29)
CREAT SERPL-MCNC: 0.98 MG/DL (ref 0.57–1)
EGFRCR SERPLBLD CKD-EPI 2021: 65.4 ML/MIN/1.73
EOSINOPHIL # BLD AUTO: 0.11 10*3/MM3 (ref 0–0.4)
EOSINOPHIL NFR BLD AUTO: 2.4 % (ref 0.3–6.2)
ERYTHROCYTE [DISTWIDTH] IN BLOOD BY AUTOMATED COUNT: 13 % (ref 12.3–15.4)
GLOBULIN SER CALC-MCNC: 2 GM/DL
GLUCOSE SERPL-MCNC: 96 MG/DL (ref 65–99)
HCT VFR BLD AUTO: 39.8 % (ref 34–46.6)
HDLC SERPL-MCNC: 67 MG/DL (ref 40–60)
HGB BLD-MCNC: 13.5 G/DL (ref 12–15.9)
IMM GRANULOCYTES # BLD AUTO: 0.02 10*3/MM3 (ref 0–0.05)
IMM GRANULOCYTES NFR BLD AUTO: 0.4 % (ref 0–0.5)
LDLC SERPL CALC-MCNC: 155 MG/DL (ref 0–100)
LYMPHOCYTES # BLD AUTO: 1.73 10*3/MM3 (ref 0.7–3.1)
LYMPHOCYTES NFR BLD AUTO: 37.9 % (ref 19.6–45.3)
MCH RBC QN AUTO: 30.8 PG (ref 26.6–33)
MCHC RBC AUTO-ENTMCNC: 33.9 G/DL (ref 31.5–35.7)
MCV RBC AUTO: 90.7 FL (ref 79–97)
MONOCYTES # BLD AUTO: 0.36 10*3/MM3 (ref 0.1–0.9)
MONOCYTES NFR BLD AUTO: 7.9 % (ref 5–12)
NEUTROPHILS # BLD AUTO: 2.31 10*3/MM3 (ref 1.7–7)
NEUTROPHILS NFR BLD AUTO: 50.7 % (ref 42.7–76)
NRBC BLD AUTO-RTO: 0 /100 WBC (ref 0–0.2)
PLATELET # BLD AUTO: 189 10*3/MM3 (ref 140–450)
POTASSIUM SERPL-SCNC: 4.3 MMOL/L (ref 3.5–5.2)
PROT SERPL-MCNC: 6.1 G/DL (ref 6–8.5)
RBC # BLD AUTO: 4.39 10*6/MM3 (ref 3.77–5.28)
SODIUM SERPL-SCNC: 144 MMOL/L (ref 136–145)
T4 FREE SERPL-MCNC: 1.14 NG/DL (ref 0.93–1.7)
TRIGL SERPL-MCNC: 101 MG/DL (ref 0–150)
TSH SERPL DL<=0.005 MIU/L-ACNC: 4.5 UIU/ML (ref 0.27–4.2)
VLDLC SERPL CALC-MCNC: 18 MG/DL (ref 5–40)
WBC # BLD AUTO: 4.56 10*3/MM3 (ref 3.4–10.8)

## 2024-02-05 ENCOUNTER — OFFICE VISIT (OUTPATIENT)
Dept: INTERNAL MEDICINE | Facility: CLINIC | Age: 64
End: 2024-02-05
Payer: COMMERCIAL

## 2024-02-05 VITALS
DIASTOLIC BLOOD PRESSURE: 72 MMHG | WEIGHT: 139.6 LBS | OXYGEN SATURATION: 98 % | SYSTOLIC BLOOD PRESSURE: 120 MMHG | HEIGHT: 62 IN | BODY MASS INDEX: 25.69 KG/M2 | TEMPERATURE: 97.3 F | HEART RATE: 65 BPM

## 2024-02-05 DIAGNOSIS — M85.852 OSTEOPENIA OF NECK OF LEFT FEMUR: ICD-10-CM

## 2024-02-05 DIAGNOSIS — Z00.00 ANNUAL PHYSICAL EXAM: Primary | ICD-10-CM

## 2024-02-05 PROCEDURE — 99396 PREV VISIT EST AGE 40-64: CPT | Performed by: STUDENT IN AN ORGANIZED HEALTH CARE EDUCATION/TRAINING PROGRAM

## 2024-02-05 NOTE — PROGRESS NOTES
Subjective   Sole Cr is a 63 y.o. female.     History of Present Illness  Patient presents for yearly exam  Labs do  Mammogram due in April  Colorectal cancer screening due in 2030  DEXA scan currently due  Vaccines up-to-date except for flu shingles tetanus  Patient states she has been feeling very well and staying active      The following portions of the patient's history were reviewed and updated as appropriate: allergies, current medications, past family history, past medical history, past social history, past surgical history, and problem list.    Review of Systems   All other systems reviewed and are negative.      Objective   Physical Exam  Vitals and nursing note reviewed.   Constitutional:       Appearance: Normal appearance.   HENT:      Head: Normocephalic and atraumatic.      Right Ear: External ear normal.      Left Ear: External ear normal.      Nose: Nose normal.      Mouth/Throat:      Mouth: Mucous membranes are moist.      Pharynx: Oropharynx is clear. No oropharyngeal exudate or posterior oropharyngeal erythema.   Eyes:      Extraocular Movements: Extraocular movements intact.      Conjunctiva/sclera: Conjunctivae normal.      Pupils: Pupils are equal, round, and reactive to light.   Cardiovascular:      Rate and Rhythm: Normal rate and regular rhythm.      Pulses: Normal pulses.      Heart sounds: Normal heart sounds.   Pulmonary:      Effort: Pulmonary effort is normal.      Breath sounds: Normal breath sounds.   Abdominal:      General: Abdomen is flat. Bowel sounds are normal.      Palpations: Abdomen is soft.   Musculoskeletal:         General: Normal range of motion.      Cervical back: Normal range of motion.   Skin:     General: Skin is warm.      Capillary Refill: Capillary refill takes less than 2 seconds.   Neurological:      General: No focal deficit present.      Mental Status: She is alert and oriented to person, place, and time. Mental status is at baseline.   Psychiatric:          Mood and Affect: Mood normal.         Behavior: Behavior normal.         Thought Content: Thought content normal.         Judgment: Judgment normal.         Assessment & Plan   Diagnoses and all orders for this visit:    1. Annual physical exam (Primary)  -     Comprehensive Metabolic Panel  -     CBC (No Diff)  -     Lipid Panel  -     TSH  -     Vitamin B12  -     Vitamin D,25-Hydroxy    2. Osteopenia of neck of left femur  -     DEXA Bone Density Axial       Lab work today  DEXA scan ordered  Patient declines vaccines today  BMI is >= 25 and <30. (Overweight) The following options were offered after discussion;: exercise counseling/recommendations and nutrition counseling/recommendations  Follow with optometry once a year  Follow with dentistry twice a year  Follow with dermatology for skin checks once a year

## 2024-02-06 ENCOUNTER — TELEPHONE (OUTPATIENT)
Dept: INTERNAL MEDICINE | Facility: CLINIC | Age: 64
End: 2024-02-06
Payer: COMMERCIAL

## 2024-02-06 LAB
25(OH)D3+25(OH)D2 SERPL-MCNC: 31.3 NG/ML (ref 30–100)
ALBUMIN SERPL-MCNC: 4.5 G/DL (ref 3.5–5.2)
ALBUMIN/GLOB SERPL: 2.5 G/DL
ALP SERPL-CCNC: 82 U/L (ref 39–117)
ALT SERPL-CCNC: 15 U/L (ref 1–33)
AST SERPL-CCNC: 19 U/L (ref 1–32)
BILIRUB SERPL-MCNC: 0.6 MG/DL (ref 0–1.2)
BUN SERPL-MCNC: 11 MG/DL (ref 8–23)
BUN/CREAT SERPL: 12.2 (ref 7–25)
CALCIUM SERPL-MCNC: 9.2 MG/DL (ref 8.6–10.5)
CHLORIDE SERPL-SCNC: 107 MMOL/L (ref 98–107)
CHOLEST SERPL-MCNC: 240 MG/DL (ref 0–200)
CO2 SERPL-SCNC: 25.9 MMOL/L (ref 22–29)
CREAT SERPL-MCNC: 0.9 MG/DL (ref 0.57–1)
EGFRCR SERPLBLD CKD-EPI 2021: 72 ML/MIN/1.73
ERYTHROCYTE [DISTWIDTH] IN BLOOD BY AUTOMATED COUNT: 12.5 % (ref 12.3–15.4)
GLOBULIN SER CALC-MCNC: 1.8 GM/DL
GLUCOSE SERPL-MCNC: 92 MG/DL (ref 65–99)
HCT VFR BLD AUTO: 40.6 % (ref 34–46.6)
HDLC SERPL-MCNC: 67 MG/DL (ref 40–60)
HGB BLD-MCNC: 13.4 G/DL (ref 12–15.9)
LDLC SERPL CALC-MCNC: 153 MG/DL (ref 0–100)
MCH RBC QN AUTO: 29.6 PG (ref 26.6–33)
MCHC RBC AUTO-ENTMCNC: 33 G/DL (ref 31.5–35.7)
MCV RBC AUTO: 89.6 FL (ref 79–97)
PLATELET # BLD AUTO: 206 10*3/MM3 (ref 140–450)
POTASSIUM SERPL-SCNC: 4.2 MMOL/L (ref 3.5–5.2)
PROT SERPL-MCNC: 6.3 G/DL (ref 6–8.5)
RBC # BLD AUTO: 4.53 10*6/MM3 (ref 3.77–5.28)
SODIUM SERPL-SCNC: 141 MMOL/L (ref 136–145)
TRIGL SERPL-MCNC: 116 MG/DL (ref 0–150)
TSH SERPL DL<=0.005 MIU/L-ACNC: 3.96 UIU/ML (ref 0.27–4.2)
VIT B12 SERPL-MCNC: 268 PG/ML (ref 211–946)
VLDLC SERPL CALC-MCNC: 20 MG/DL (ref 5–40)
WBC # BLD AUTO: 6.19 10*3/MM3 (ref 3.4–10.8)

## 2024-02-06 NOTE — TELEPHONE ENCOUNTER
"Relay     \"Please call the patient regarding her normal result.   Cholesterol is still elevated similar to last year.  I recommend over-the-counter omega-3's, low-cholesterol diet and exercise 30 minutes a day 5 days a week as tolerated \"        "

## 2024-02-06 NOTE — TELEPHONE ENCOUNTER
----- Message from Zenadia Montgomery DO sent at 2/6/2024  7:58 AM EST -----  Please call the patient regarding her normal result.  Cholesterol is still elevated similar to last year.  I recommend over-the-counter omega-3's, low-cholesterol diet and exercise 30 minutes a day 5 days a week as tolerated

## 2024-03-15 ENCOUNTER — APPOINTMENT (OUTPATIENT)
Dept: BONE DENSITY | Facility: HOSPITAL | Age: 64
End: 2024-03-15
Payer: COMMERCIAL

## 2024-03-15 PROCEDURE — 77080 DXA BONE DENSITY AXIAL: CPT

## 2024-04-11 ENCOUNTER — TELEPHONE (OUTPATIENT)
Dept: INTERNAL MEDICINE | Facility: CLINIC | Age: 64
End: 2024-04-11
Payer: COMMERCIAL

## 2024-04-11 NOTE — TELEPHONE ENCOUNTER
Caller: Sole Cr    Relationship: Self    Best call back number: [phone]    What does billing need from the patient: PATIENT STATED THAT BONE DENSITY TEST THAT WAS DONE A MONTH AGO; PAID HALF THOUGHT THAT THIS TEST WAS PREVENTIVE THAT SHOULD HAVE BEEN COVERED     PLEASE ADVISE

## 2024-04-12 NOTE — TELEPHONE ENCOUNTER
Relayed to patient. She said that she did reach out to insurance and they told her not coded as not preventative. It is $350. She also spoke with the hospital and she said they were very rude with her, but didn't help. I told her I would reach out to you.

## 2025-02-06 ENCOUNTER — OFFICE VISIT (OUTPATIENT)
Dept: INTERNAL MEDICINE | Facility: CLINIC | Age: 65
End: 2025-02-06
Payer: COMMERCIAL

## 2025-02-06 VITALS
HEART RATE: 70 BPM | DIASTOLIC BLOOD PRESSURE: 70 MMHG | HEIGHT: 62 IN | BODY MASS INDEX: 24.84 KG/M2 | WEIGHT: 135 LBS | OXYGEN SATURATION: 99 % | TEMPERATURE: 98 F | SYSTOLIC BLOOD PRESSURE: 112 MMHG

## 2025-02-06 DIAGNOSIS — R79.89 ELEVATED TSH: ICD-10-CM

## 2025-02-06 DIAGNOSIS — R79.9 ABNORMAL BLOOD CHEMISTRY: ICD-10-CM

## 2025-02-06 DIAGNOSIS — E55.9 VITAMIN D DEFICIENCY: ICD-10-CM

## 2025-02-06 DIAGNOSIS — Z00.00 ANNUAL PHYSICAL EXAM: ICD-10-CM

## 2025-02-06 DIAGNOSIS — Z23 NEED FOR TDAP VACCINATION: Primary | ICD-10-CM

## 2025-02-06 DIAGNOSIS — E78.2 MIXED HYPERLIPIDEMIA: ICD-10-CM

## 2025-02-06 PROCEDURE — 90471 IMMUNIZATION ADMIN: CPT | Performed by: STUDENT IN AN ORGANIZED HEALTH CARE EDUCATION/TRAINING PROGRAM

## 2025-02-06 PROCEDURE — 90715 TDAP VACCINE 7 YRS/> IM: CPT | Performed by: STUDENT IN AN ORGANIZED HEALTH CARE EDUCATION/TRAINING PROGRAM

## 2025-02-06 PROCEDURE — 99396 PREV VISIT EST AGE 40-64: CPT | Performed by: STUDENT IN AN ORGANIZED HEALTH CARE EDUCATION/TRAINING PROGRAM

## 2025-02-06 NOTE — PROGRESS NOTES
Subjective   Sole Cr is a 64 y.o. female.     History of Present Illness  Patient presents today for annual exam  Mammogram will be due in April and has already been in touch with radiology department to have that scheduled  Vaccines not up-to-date include flu COVID shingles Tdap  Colorectal cancer screening is up-to-date  Lab work is due      The following portions of the patient's history were reviewed and updated as appropriate: allergies, current medications, past family history, past medical history, past social history, past surgical history, and problem list.    Review of Systems   All other systems reviewed and are negative.      Objective   Physical Exam  Vitals and nursing note reviewed.   Constitutional:       Appearance: Normal appearance.   HENT:      Head: Normocephalic and atraumatic.      Right Ear: External ear normal.      Left Ear: External ear normal.      Nose: Nose normal.      Mouth/Throat:      Mouth: Mucous membranes are moist.      Pharynx: Oropharynx is clear. No oropharyngeal exudate or posterior oropharyngeal erythema.   Eyes:      Extraocular Movements: Extraocular movements intact.      Conjunctiva/sclera: Conjunctivae normal.      Pupils: Pupils are equal, round, and reactive to light.   Cardiovascular:      Rate and Rhythm: Normal rate and regular rhythm.      Pulses: Normal pulses.      Heart sounds: Normal heart sounds.   Pulmonary:      Effort: Pulmonary effort is normal.      Breath sounds: Normal breath sounds.   Abdominal:      General: Abdomen is flat. Bowel sounds are normal.      Palpations: Abdomen is soft.   Musculoskeletal:         General: Normal range of motion.      Cervical back: Normal range of motion.   Skin:     General: Skin is warm.      Capillary Refill: Capillary refill takes less than 2 seconds.   Neurological:      General: No focal deficit present.      Mental Status: She is alert and oriented to person, place, and time. Mental status is at baseline.    Psychiatric:         Mood and Affect: Mood normal.         Behavior: Behavior normal.         Thought Content: Thought content normal.         Judgment: Judgment normal.         Assessment & Plan   Diagnoses and all orders for this visit:    1. Need for Tdap vaccination (Primary)  -     Tdap Vaccine Greater Than or Equal To 6yo IM    2. Annual physical exam  -     CBC (No Diff)  -     Comprehensive Metabolic Panel  -     Hemoglobin A1c  -     Lipid Panel  -     TSH  -     Vitamin B12  -     Vitamin D,25-Hydroxy    3. Elevated TSH  -     CBC (No Diff)  -     Comprehensive Metabolic Panel  -     Hemoglobin A1c  -     Lipid Panel  -     TSH  -     Vitamin B12  -     Vitamin D,25-Hydroxy    4. Mixed hyperlipidemia  -     CBC (No Diff)  -     Comprehensive Metabolic Panel  -     Hemoglobin A1c  -     Lipid Panel  -     TSH  -     Vitamin B12  -     Vitamin D,25-Hydroxy    5. Vitamin D deficiency  -     CBC (No Diff)  -     Comprehensive Metabolic Panel  -     Hemoglobin A1c  -     Lipid Panel  -     TSH  -     Vitamin B12  -     Vitamin D,25-Hydroxy    6. Abnormal blood chemistry  -     CBC (No Diff)  -     Comprehensive Metabolic Panel  -     Hemoglobin A1c  -     Lipid Panel  -     TSH  -     Vitamin B12  -     Vitamin D,25-Hydroxy         Counseled on heart healthy diet  Counseled on exercise 30 minutes a day 5 days a week  Follow with optometry once a year  Follow with dentistry twice a year  Brush and floss twice a day  Wear seatbelt while in a car  Labs ordered  Tdap given

## 2025-02-06 NOTE — LETTER
[facility]  Vaccine Consent Form    Patient Name:  Sole Cr  Patient :  1960     Vaccine(s) Ordered    Tdap Vaccine Greater Than or Equal To 8yo IM        Screening Checklist  The following questions should be completed prior to vaccination. If you answer “yes” to any question, it does not necessarily mean you should not be vaccinated. It just means we may need to clarify or ask more questions. If a question is unclear, please ask your healthcare provider to explain it.    Yes No   Any fever or moderate to severe illness today (mild illness and/or antibiotic treatment are not contraindications)?     Do you have a history of a serious reaction to any previous vaccinations, such as anaphylaxis, encephalopathy within 7 days, Guillain-[place] syndrome within 6 weeks, seizure?     Have you received any live vaccine(s) (e.g MMR, MARI) or any other vaccines in the last month (to ensure duplicate doses aren't given)?     Do you have an anaphylactic allergy to latex (DTaP, DTaP-IPV, Hep A, Hep B, MenB, RV, Td, Tdap), baker’s yeast (Hep B, HPV), polysorbates (RSV, nirsevimab, PCV 20, Rotavirrus, Tdap, Shingrix), or gelatin (MARI, MMR)?     Do you have an anaphylactic allergy to neomycin (Rabies, MARI, MMR, IPV, Hep A), polymyxin B (IPV), or streptomycin (IPV)?      Any cancer, leukemia, AIDS, or other immune system disorder? (MARI, MMR, RV)     Do you have a parent, brother, or sister with an immune system problem (if immune competence of vaccine recipient clinically verified, can proceed)? (MMR, MARI)     Any recent steroid treatments for >2 weeks, chemotherapy, or radiation treatment? (MARI, MMR)     Have you received antibody-containing blood transfusions or IVIG in the past 11 months (recommended interval is dependent on product)? (MMR, MARI)     Have you taken antiviral drugs (acyclovir, famciclovir, valacyclovir for MARI) in the last 24 or 48 hours, respectively?      Are you pregnant or planning to become  "pregnant within 1 month? (MARI, MMR, HPV, IPV, MenB, Abrexvy; For Hep B- refer to Engerix-B; For RSV - Abrysvo is indicated for 32-36 weeks of pregnancy from September to January)     For infants, have you ever been told your child has had intussusception or a medical emergency involving obstruction of the intestine (Rotavirus)? If not for an infant, can skip this question.         *Ordering Physicians/APC should be consulted if \"yes\" is checked by the patient or guardian above.  I have received, read, and understand the Vaccine Information Statement (VIS) for each vaccine ordered.  I have considered my or my child's health status as well as the health status of my close contacts.  I have taken the opportunity to discuss my vaccine questions with my or my child's health care provider.   I have requested that the ordered vaccine(s) be given to me or my child.  I understand the benefits and risks of the vaccines.  I understand that I should remain in the clinic for 15 minutes after receiving the vaccine(s).  _________________________________________________________  Signature of Patient or Parent/Legal Guardian ____________________  Date     "

## 2025-02-07 LAB
25(OH)D3+25(OH)D2 SERPL-MCNC: 30.2 NG/ML (ref 30–100)
ALBUMIN SERPL-MCNC: 4.4 G/DL (ref 3.5–5.2)
ALBUMIN/GLOB SERPL: 1.9 G/DL
ALP SERPL-CCNC: 91 U/L (ref 39–117)
ALT SERPL-CCNC: 15 U/L (ref 1–33)
AST SERPL-CCNC: 21 U/L (ref 1–32)
BILIRUB SERPL-MCNC: 0.7 MG/DL (ref 0–1.2)
BUN SERPL-MCNC: 18 MG/DL (ref 8–23)
BUN/CREAT SERPL: 20 (ref 7–25)
CALCIUM SERPL-MCNC: 9.6 MG/DL (ref 8.6–10.5)
CHLORIDE SERPL-SCNC: 107 MMOL/L (ref 98–107)
CHOLEST SERPL-MCNC: 242 MG/DL (ref 0–200)
CO2 SERPL-SCNC: 27.6 MMOL/L (ref 22–29)
CREAT SERPL-MCNC: 0.9 MG/DL (ref 0.57–1)
EGFRCR SERPLBLD CKD-EPI 2021: 71.5 ML/MIN/1.73
ERYTHROCYTE [DISTWIDTH] IN BLOOD BY AUTOMATED COUNT: 12.5 % (ref 12.3–15.4)
GLOBULIN SER CALC-MCNC: 2.3 GM/DL
GLUCOSE SERPL-MCNC: 96 MG/DL (ref 65–99)
HBA1C MFR BLD: 5.7 % (ref 4.8–5.6)
HCT VFR BLD AUTO: 43.6 % (ref 34–46.6)
HDLC SERPL-MCNC: 61 MG/DL (ref 40–60)
HGB BLD-MCNC: 13.9 G/DL (ref 12–15.9)
LDLC SERPL CALC-MCNC: 158 MG/DL (ref 0–100)
MCH RBC QN AUTO: 29.8 PG (ref 26.6–33)
MCHC RBC AUTO-ENTMCNC: 31.9 G/DL (ref 31.5–35.7)
MCV RBC AUTO: 93.6 FL (ref 79–97)
PLATELET # BLD AUTO: 201 10*3/MM3 (ref 140–450)
POTASSIUM SERPL-SCNC: 4.9 MMOL/L (ref 3.5–5.2)
PROT SERPL-MCNC: 6.7 G/DL (ref 6–8.5)
RBC # BLD AUTO: 4.66 10*6/MM3 (ref 3.77–5.28)
SODIUM SERPL-SCNC: 143 MMOL/L (ref 136–145)
TRIGL SERPL-MCNC: 128 MG/DL (ref 0–150)
TSH SERPL DL<=0.005 MIU/L-ACNC: 4.26 UIU/ML (ref 0.27–4.2)
VIT B12 SERPL-MCNC: 339 PG/ML (ref 211–946)
VLDLC SERPL CALC-MCNC: 23 MG/DL (ref 5–40)
WBC # BLD AUTO: 5.11 10*3/MM3 (ref 3.4–10.8)